# Patient Record
(demographics unavailable — no encounter records)

---

## 2024-10-07 NOTE — PHYSICAL EXAM
[Alert] : alert [No Acute Distress] : no acute distress [Well Developed] : well developed [Normal Voice/Communication] : normal voice communication [PERRL] : pupils equal, round and reactive to light [Normal Hearing] : hearing was normal [No Neck Mass] : no neck mass was observed [Thyroid Not Enlarged] : the thyroid was not enlarged [No Respiratory Distress] : no respiratory distress [Clear to Auscultation] : lungs were clear to auscultation bilaterally [Normal Rate] : heart rate was normal [Regular Rhythm] : with a regular rhythm [No Edema] : no peripheral edema [Normal Bowel Sounds] : normal bowel sounds [Soft] : abdomen soft [Normal Supraclavicular Nodes] : no supraclavicular lymphadenopathy [Normal Anterior Cervical Nodes] : no anterior cervical lymphadenopathy [No Clubbing, Cyanosis] : no clubbing  or cyanosis of the fingernails [Normal Reflexes] : deep tendon reflexes were 2+ and symmetric [No Tremors] : no tremors [Oriented x3] : oriented to person, place, and time [Normal Affect] : the affect was normal [Normal Mood] : the mood was normal

## 2024-10-07 NOTE — HISTORY OF PRESENT ILLNESS
[FreeTextEntry1] : 64 y.o.  Male with PMHx of T2D, CKD, CAD, s/p CABG, Ischemic Cardiomyopathy, s/p defibrillator, HTN, HLD referred for evaluation and management of DM. Dx 22 y.ago. Has been on oral hypoglycemics only. FHx mother with T2D. 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

## 2024-10-07 NOTE — ASSESSMENT
[FreeTextEntry1] :  Christopher [id number] 64 y.o.  Male with PMHx of T2D, CKD, CAD, s/p CABG, Ischemic Cardiomyopathy, s/p defibrillator, HTN, HLD referred for evaluation and management of DM. Dx 22 y.ago. Has been on oral hypoglycemics only. FHx mother with T2D. Patient works for [employer] as "[employer]" for [employer]. He is fluent in English but requested Pashto interpretation for support.   Currently on: Metformin 1000 mg BID Farxiga 10 mg daily Glimepiride 2 mg daily  SMBG: Reportedly fasting BG 90 - 100  #  Long standing T2D A1C 6.2% in CKD (will check Fructosamine next time) Doing well. Continue current regimen.  Discussed dietary and lifestyle modifications.  # CKD Stable renal function Consider decreasing Metformin if renal function declines  # HLD Continue Rosuvastatin 40 mg daily Low fat/cholesterol diet  # Vit D insufficiency Start Vit D3 1000 IU daily (OTC)  F/u in 3 months with NP F/u with me in 6 months.

## 2024-10-24 NOTE — REASON FOR VISIT
[FreeTextEntry1] : Mr. Kovacs is a pleasant 64-year-old  male, native of [facility], with a past medical history significant for hypertension, hyperlipidemia, non-insulin dependent diabetes mellitus, and coronary artery disease/ischemic cardiomyopathy status-post CABG x 4 as well as marked left ventricular dysfunction (HFrEF) status post AICD implantation for primary prevention who presents for follow up evaluation.

## 2024-10-24 NOTE — HISTORY OF PRESENT ILLNESS
[FreeTextEntry1] : From a cardiac standpoint, Mr. Kovacs continues to do well denying exertional chest pain, shortness of breath, palpitations, lightheadedness, or syncope. The patient is s/p recent echocardiogram and presents here today for follow up.

## 2024-10-24 NOTE — ASSESSMENT
[FreeTextEntry1] : 1. EKG today reveals sinus rhythm at 81 bpm. Frequent PVCs. Qs in Leads II, III, and aVF consistent with prior inferior wall MI. Normal intervals. Chronic lateral T wave inversions.   2. Hypertension: Blood pressure is controlled at this time on current medications. A low-salt diet was discussed. We will reduce diuretic therapy to an every third day regimen. We will likely discontinue Furosemide if blood pressure remains intact. I would consider Spironolactone but will require follow up blood pressure prior to making that decision.   3. Hyperlipidemia: Review of recent blood work reveals a total cholesterol of 184, HDL 40, TC/HDL ratio 4.6, , triglycerides 136. The patient is on maximal doses of Rosuvastatin at this point. He states he is not eating as much by way of red meat, but there is some cheese and eggs in his diet. I have asked him to reduce this significantly. We will have him undergo follow up blood work in approximately 6-8 weeks to further assess dietary changes. May well require the addition of PCSK9 inhibitor to current Rosuvastatin therapy should his LDL target of 70 not be obtained.   4. Non-insulin dependent diabetes mellitus: The patient is doing well with respect to diabetic control. Hemoglobin A1C is down to 6.2. This is currently being followed by both primary care physician and endocrinology.   5. Coronary artery disease / ischemic cardiomyopathy s/p CABG x 4: Recent echocardiography reveals mild left ventricular enlargement with significant wall motion abnormalities involving the inferior wall, basal and mid inferolateral walls, basal inferoseptal segments, as well as apical septal, apical anterior, and apex in general all being either akinetic or hypokinetic. The left ventricular ejection fraction is severely decreased with an ejection fraction estimated between 25-30%. The left atrium and right-sided chambers reveal normal dimensions and function. Mild mitral regurgitation and mild tricuspid regurgitation are noted. Pulmonary artery pressure is 44 mm Hg consistent with mild pulmonary hypertension.   6. Despite the above-noted echocardiographic findings, Mr. Kovacs remains stable and active. He is able to walk on flat surfaces for 15 minutes at a time. He has some difficulty with inclines. I have advised him to keep exercising and we will reevaluate him in approximately two months.

## 2024-10-24 NOTE — REASON FOR VISIT
[FreeTextEntry1] : Mr. Kovacs is a pleasant 64-year-old  male, native of [facility], with a past medical history significant for hypertension, hyperlipidemia, non-insulin dependent diabetes mellitus, and coronary artery disease/ischemic cardiomyopathy status-post CABG x 4 as well as marked left ventricular dysfunction (HFrEF) status post AICD implantation for primary prevention who presents for follow up evaluation.

## 2024-10-24 NOTE — PHYSICAL EXAM
[Well Developed] : well developed [Well Nourished] : well nourished [No Acute Distress] : no acute distress [Normal Conjunctiva] : normal conjunctiva [Normal Venous Pressure] : normal venous pressure [No Carotid Bruit] : no carotid bruit [Normal S1, S2] : normal S1, S2 [No Rub] : no rub [S4] : S4 [Clear Lung Fields] : clear lung fields [Normal Bowel Sounds] : normal bowel sounds [Normal Gait] : normal gait [No Edema] : no edema [No Rash] : no rash [Moves all extremities] : moves all extremities [No Focal Deficits] : no focal deficits [Normal Speech] : normal speech [Alert and Oriented] : alert and oriented [Normal memory] : normal memory [de-identified] : I/VI systolic murmur.

## 2024-10-24 NOTE — PHYSICAL EXAM
[Well Developed] : well developed [Well Nourished] : well nourished [No Acute Distress] : no acute distress [Normal Conjunctiva] : normal conjunctiva [Normal Venous Pressure] : normal venous pressure [No Carotid Bruit] : no carotid bruit [Normal S1, S2] : normal S1, S2 [No Rub] : no rub [S4] : S4 [Clear Lung Fields] : clear lung fields [Normal Bowel Sounds] : normal bowel sounds [Normal Gait] : normal gait [No Edema] : no edema [No Rash] : no rash [Moves all extremities] : moves all extremities [No Focal Deficits] : no focal deficits [Normal Speech] : normal speech [Alert and Oriented] : alert and oriented [Normal memory] : normal memory [de-identified] : I/VI systolic murmur.

## 2024-12-20 NOTE — DISCUSSION/SUMMARY
[FreeTextEntry1] : 1 - Hypertension:  blood pressure well controlled on current medications.  Advised to follow low sodium diet.  Doing well with the decrease in furosemide to every third day.  2 - Hyperlipidemia:  cholesterol 186, HDL 37, , triglycerides 188, TC/HDL 5.  Need target LDL<70.  Will continue with Rosuvastatin 40mg daily and will start Repatha every 2 weeks.  Follow low fat, low cholesterol, low carb diet.  Increase physical activity/exercise.  Fasting blood work in 2 months.  3 - Coronary artery disease / ischemic cardiomyopathy status-post CABG x 4:  clinically stable at this time.  Denies complaints of exertional chest pain, shortness of breath, palpitations, lightheadedness or syncope.  Patient's most recent echocardiogram revealed mild left ventricular enlargement with significant wall motion abnormalities involving the inferior wall, basal and mid inferolateral walls, basal inferoseptal segments, as well as apical septal, apical anterior, and apex in general all being either akinetic or hypokinetic. The left ventricular ejection fraction is severely decreased with an ejection fraction estimated between 25-30%. The left atrium and right-sided chambers reveal normal dimensions and function. Mild mitral regurgitation and mild tricuspid regurgitation are noted. Pulmonary artery pressure is 44 mm Hg consistent with mild pulmonary hypertension.  Will refer Mr. Kovacs to the CHF team (Dr. Bibi Bruno).  Will continue with carvedilol 25mg BID, Entresto 97/103mg BID, Farxiga 10mg daily, and furosemide 20mg every third day for now.  May benefit from discontinuation of furosemide and start spironolactone 25mg daily.  Will defer to Dr. Bruno on thoughts of diuretic therapy.  BUN 33, creatinine 1.24, potassium 4.9, .  4 - Non-insulin dependent diabetes mellitus:  fasting glucose 101, HgA1c 6.8.   5 - Hypovitaminosis D:  most recent level 28.  Started Vitamin D3 2000 units daily approximately 1-2 weeks ago.  Will recheck level prior to follow up visit.  6 - Follow up with Dr. Varela in 3 months. [EKG obtained to assist in diagnosis and management of assessed problem(s)] : EKG obtained to assist in diagnosis and management of assessed problem(s)

## 2024-12-20 NOTE — PHYSICAL EXAM
[Well Developed] : well developed [Well Nourished] : well nourished [No Acute Distress] : no acute distress [Normal Conjunctiva] : normal conjunctiva [Normal Venous Pressure] : normal venous pressure [No Carotid Bruit] : no carotid bruit [Normal S1, S2] : normal S1, S2 [No Rub] : no rub [S4] : S4 [Clear Lung Fields] : clear lung fields [Normal Bowel Sounds] : normal bowel sounds [Normal Gait] : normal gait [No Edema] : no edema [No Rash] : no rash [Moves all extremities] : moves all extremities [No Focal Deficits] : no focal deficits [Normal Speech] : normal speech [Alert and Oriented] : alert and oriented [Normal memory] : normal memory [de-identified] : I/VI systolic murmur.

## 2024-12-20 NOTE — REASON FOR VISIT
[FreeTextEntry1] : Mr. Kovacs is a pleasant 65-year-old  male, native of [facility], with a past medical history significant for hypertension, hyperlipidemia, non-insulin dependent diabetes mellitus, and coronary artery disease/ischemic cardiomyopathy status-post CABG x 4 as well as marked left ventricular dysfunction (HFrEF) status post AICD implantation for primary prevention who presents for follow up evaluation.

## 2024-12-20 NOTE — HISTORY OF PRESENT ILLNESS
[FreeTextEntry1] : Mr. Kovacs presents today feeling well.  Denies complaints of exertional chest pain, shortness of breath, palpitations, lightheadedness or syncope.

## 2024-12-20 NOTE — CARDIOLOGY SUMMARY
[de-identified] : Sinus rhythm at 71 bpm.  Frequent PVCs.  Qs in leads II, III and aVF consistent with prior inferior wall MI.  Chronic lateral T-wave inversions.

## 2025-01-15 NOTE — ASSESSMENT
[FreeTextEntry1] : T2DM- controlled, goal A1C < 7 - Notable for increase in A1C from 6.2% to now A1C 6.8%, no fructosamine checked - Patient attributes to holidays and snacking more - Discussed diet and exercise - Goal exercise 5x per week for 30 min each day - Discussed the need to see Ophthalmology and Podiatry yearly - Continue Metformin 1000 mg BID, Farxiga 10 mg daily, Glimepiride 2 mg daily   CKD- Stable renal function - Will consider decreasing Metformin if renal function declines  HLD- goal LDL <70 - On Rosuvastatin 40 mg daily, Repatha ordered by Cardiology but patient has not started yet - Low fat/cholesterol diet - Managed by Cards  Vit D insufficiency Continue OTC Vit D3 2000 IU daily- just started two weeks ago   I have counseled the patient on the benefits, risks and side effects of this SGLT 2 inhibitor. Patient will benefit from SGLT 2 inhibitors due to the cardiac and renal protective effects, as well as the glucose reduction. I have also discussed the possible risks of UTI, yeast infections and euglycemic DKA. I instructed the patient to hold the medication if they are suffering from fever or going for surgery, or in the case of infection as this can precipitate euglycemic DKA. Medication should be held at least 3-4 days during those situations. Patient able to verbalize and teach back understanding.   RTO in 3 months with MD

## 2025-01-15 NOTE — HISTORY OF PRESENT ILLNESS
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 Labs reviewed.

## 2025-01-15 NOTE — PHYSICAL EXAM
[Alert] : alert [No Acute Distress] : no acute distress [No Accessory Muscle Use] : no accessory muscle use [Normal Rate and Effort] : normal respiratory rate and effort [Clear to Auscultation] : lungs were clear to auscultation bilaterally [Normal Rate] : heart rate was normal [Regular Rhythm] : with a regular rhythm [Oriented x3] : oriented to person, place, and time [Normal Affect] : the affect was normal [Normal Mood] : the mood was normal

## 2025-04-14 NOTE — CARDIOLOGY SUMMARY
[de-identified] : Sinus rhythm at 73 bpm.  Qs in leads II, III and aVF consistent with prior inferior wall MI.  Chronic lateral T-wave inversions.

## 2025-04-14 NOTE — REASON FOR VISIT
[FreeTextEntry1] : Mr. Kovacs is a pleasant 65-year-old  male, native of [facility], with a past medical history significant for hypertension, hyperlipidemia, non-insulin dependent diabetes mellitus, and coronary artery disease/ischemic cardiomyopathy status-post CABG x 4 as well as marked left ventricular dysfunction (HFrEF) status post AICD implantation for primary prevention who presents for follow up evaluation.

## 2025-04-14 NOTE — PHYSICAL EXAM
[Well Developed] : well developed [Well Nourished] : well nourished [No Acute Distress] : no acute distress [Normal Conjunctiva] : normal conjunctiva [Normal Venous Pressure] : normal venous pressure [No Carotid Bruit] : no carotid bruit [Normal S1, S2] : normal S1, S2 [No Rub] : no rub [S4] : S4 [Clear Lung Fields] : clear lung fields [Normal Bowel Sounds] : normal bowel sounds [Normal Gait] : normal gait [No Edema] : no edema [No Rash] : no rash [Moves all extremities] : moves all extremities [No Focal Deficits] : no focal deficits [Normal Speech] : normal speech [Alert and Oriented] : alert and oriented [Normal memory] : normal memory [de-identified] : I/VI systolic murmur.

## 2025-04-14 NOTE — CARDIOLOGY SUMMARY
[de-identified] : Sinus rhythm at 73 bpm.  Qs in leads II, III and aVF consistent with prior inferior wall MI.  Chronic lateral T-wave inversions.

## 2025-04-14 NOTE — PHYSICAL EXAM
[Well Developed] : well developed [Well Nourished] : well nourished [No Acute Distress] : no acute distress [Normal Conjunctiva] : normal conjunctiva [Normal Venous Pressure] : normal venous pressure [No Carotid Bruit] : no carotid bruit [Normal S1, S2] : normal S1, S2 [No Rub] : no rub [S4] : S4 [Clear Lung Fields] : clear lung fields [Normal Bowel Sounds] : normal bowel sounds [Normal Gait] : normal gait [No Edema] : no edema [No Rash] : no rash [Moves all extremities] : moves all extremities [No Focal Deficits] : no focal deficits [Normal Speech] : normal speech [Alert and Oriented] : alert and oriented [Normal memory] : normal memory [de-identified] : I/VI systolic murmur.

## 2025-04-14 NOTE — HISTORY OF PRESENT ILLNESS
[FreeTextEntry1] : Mr. Kovacs presents today feeling well.  Denies chest pain, shortness of breath, palpitations, lightheadedness or syncope.

## 2025-04-14 NOTE — REASON FOR VISIT
[FreeTextEntry1] : Mr. Kovacs is a pleasant 65-year-old  male, native of [facility], with a past medical history significant for hypertension, hyperlipidemia, non-insulin dependent diabetes mellitus, and coronary artery disease/ischemic cardiomyopathy status-post CABG x 4 as well as marked left ventricular dysfunction (HFrEF) status post AICD implantation for primary prevention who presents for follow up evaluation.

## 2025-04-14 NOTE — DISCUSSION/SUMMARY
[EKG obtained to assist in diagnosis and management of assessed problem(s)] : EKG obtained to assist in diagnosis and management of assessed problem(s) [FreeTextEntry1] : 1 - Hypertension:  blood pressure well controlled on current medications.  Advised to follow strict low sodium diet.  2 - Hyperlipidemia:  cholesterol 172, HDL 33, , triglycerides 170, TC/HDL 5.2.  Patient currently taking rosuvastatin 40mg daily.  Repatha was prescribed at patient's last visit.  Mr. Kovacs states that "the pharmacies did not have any."  Will call pharmacies to see where the medication may be available.  Follow low fat, low cholesterol, low carb diet.  Increase physical activity/exercise.  Fasting blood work prior to follow up visit.  3 - Coronary artery disease / ischemic cardiomyopathy status-post CABG x 4:  clinically stable at this time.  Denies chest pain, shortness of breath, palpitations, lightheadedness or syncope.  Tolerating carvedilol 25mg BID, Entresto 49/51mg BID, Farxiga 10mg daily, furosemide 20mg every other day.  BUN 33, creatinine 1.44, potassium 5.2.  Mr. Kovacs has not yet been seen by Dr. Bruno and the heart failure team.  4 - ICD implantation:  patient to date has had his device interrogated at Dr. Langley's office.  He would like to have his monitoring transferred to our office.  We will make arrangements for the transfer.  5 - Non-insulin diabetes mellitus:  fasting glucose 121, HgA1c 7.3.  Managed by endocrinology.  Follow low carb diet.  6 - Hypovitaminosis D:  level 28.4.  Increase vitamin D3 to 5000 units daily.  7 - Follow up with Dr. Varela in 3-4 months.